# Patient Record
Sex: FEMALE | Race: BLACK OR AFRICAN AMERICAN | Employment: PART TIME | URBAN - METROPOLITAN AREA
[De-identification: names, ages, dates, MRNs, and addresses within clinical notes are randomized per-mention and may not be internally consistent; named-entity substitution may affect disease eponyms.]

---

## 2021-01-14 ENCOUNTER — HOSPITAL ENCOUNTER (OUTPATIENT)
Age: 26
Discharge: HOME | End: 2021-01-14
Payer: COMMERCIAL

## 2021-01-14 ENCOUNTER — HOSPITAL ENCOUNTER (OUTPATIENT)
Dept: GENERAL RADIOLOGY | Age: 26
Discharge: HOME | End: 2021-01-14
Payer: COMMERCIAL

## 2021-01-14 ENCOUNTER — OFFICE VISIT (OUTPATIENT)
Dept: PRIMARY CARE CLINIC | Age: 26
End: 2021-01-14
Payer: COMMERCIAL

## 2021-01-14 VITALS
DIASTOLIC BLOOD PRESSURE: 70 MMHG | TEMPERATURE: 97.2 F | SYSTOLIC BLOOD PRESSURE: 110 MMHG | WEIGHT: 172 LBS | HEART RATE: 81 BPM | HEIGHT: 64 IN | BODY MASS INDEX: 29.37 KG/M2 | OXYGEN SATURATION: 98 %

## 2021-01-14 DIAGNOSIS — R55 VASOVAGAL SYNCOPE: Primary | ICD-10-CM

## 2021-01-14 DIAGNOSIS — D50.0 IRON DEFICIENCY ANEMIA DUE TO CHRONIC BLOOD LOSS: ICD-10-CM

## 2021-01-14 DIAGNOSIS — R06.2 WHEEZING: ICD-10-CM

## 2021-01-14 DIAGNOSIS — F41.8 DEPRESSION WITH ANXIETY: ICD-10-CM

## 2021-01-14 DIAGNOSIS — R55 VASOVAGAL SYNCOPE: ICD-10-CM

## 2021-01-14 LAB
BASOPHILS ABSOLUTE: 0 K/UL (ref 0–0.2)
BASOPHILS RELATIVE PERCENT: 0.6 %
CONTROL: NORMAL
EOSINOPHILS ABSOLUTE: 0.1 K/UL (ref 0–0.6)
EOSINOPHILS RELATIVE PERCENT: 1.8 %
HCT VFR BLD CALC: 38.9 % (ref 36–48)
HEMOGLOBIN: 12.4 G/DL (ref 12–16)
LYMPHOCYTES ABSOLUTE: 2 K/UL (ref 1–5.1)
LYMPHOCYTES RELATIVE PERCENT: 32 %
MCH RBC QN AUTO: 25.2 PG (ref 26–34)
MCHC RBC AUTO-ENTMCNC: 31.9 G/DL (ref 31–36)
MCV RBC AUTO: 79 FL (ref 80–100)
MONOCYTES ABSOLUTE: 0.3 K/UL (ref 0–1.3)
MONOCYTES RELATIVE PERCENT: 5.3 %
NEUTROPHILS ABSOLUTE: 3.7 K/UL (ref 1.7–7.7)
NEUTROPHILS RELATIVE PERCENT: 60.3 %
PDW BLD-RTO: 14.2 % (ref 12.4–15.4)
PLATELET # BLD: 270 K/UL (ref 135–450)
PMV BLD AUTO: 8.4 FL (ref 5–10.5)
PREGNANCY TEST URINE, POC: NORMAL
RBC # BLD: 4.93 M/UL (ref 4–5.2)
WBC # BLD: 6.2 K/UL (ref 4–11)

## 2021-01-14 PROCEDURE — 81025 URINE PREGNANCY TEST: CPT | Performed by: STUDENT IN AN ORGANIZED HEALTH CARE EDUCATION/TRAINING PROGRAM

## 2021-01-14 PROCEDURE — 99213 OFFICE O/P EST LOW 20 MIN: CPT | Performed by: STUDENT IN AN ORGANIZED HEALTH CARE EDUCATION/TRAINING PROGRAM

## 2021-01-14 PROCEDURE — 71046 X-RAY EXAM CHEST 2 VIEWS: CPT

## 2021-01-14 RX ORDER — SERTRALINE HYDROCHLORIDE 100 MG/1
75 TABLET, FILM COATED ORAL DAILY
COMMUNITY
End: 2021-02-09

## 2021-01-14 RX ORDER — ACETAMINOPHEN 160 MG
2000 TABLET,DISINTEGRATING ORAL
COMMUNITY
End: 2021-02-09

## 2021-01-14 RX ORDER — NORETHINDRONE ACETATE AND ETHINYL ESTRADIOL 1MG-20(21)
1 KIT ORAL DAILY
COMMUNITY
End: 2021-07-12 | Stop reason: SDUPTHER

## 2021-01-14 RX ORDER — FERROUS SULFATE 325(65) MG
325 TABLET ORAL
COMMUNITY
End: 2021-02-09

## 2021-01-14 RX ORDER — DOCUSATE SODIUM 100 MG/1
100 CAPSULE, LIQUID FILLED ORAL 2 TIMES DAILY
COMMUNITY
End: 2021-02-09

## 2021-01-14 SDOH — ECONOMIC STABILITY: INCOME INSECURITY: HOW HARD IS IT FOR YOU TO PAY FOR THE VERY BASICS LIKE FOOD, HOUSING, MEDICAL CARE, AND HEATING?: NOT HARD AT ALL

## 2021-01-14 SDOH — ECONOMIC STABILITY: TRANSPORTATION INSECURITY
IN THE PAST 12 MONTHS, HAS THE LACK OF TRANSPORTATION KEPT YOU FROM MEDICAL APPOINTMENTS OR FROM GETTING MEDICATIONS?: NO

## 2021-01-14 ASSESSMENT — PATIENT HEALTH QUESTIONNAIRE - PHQ9: 2. FEELING DOWN, DEPRESSED OR HOPELESS: 1

## 2021-01-14 NOTE — PROGRESS NOTES
2021    Dev Odom (:  1995) is a 30 y.o. female, here for a preventive medicine evaluation. There is no problem list on file for this patient. Last night was sitting in bathroom just talking and started feeling dizzy then out for 15-30 seconds, some twitching but no seizure activity. Took diflucan Tuesday then a week before that. No vision changes. No HA. No weakness in arms or legs. Heavy snorer. Has never happened before  No changes in diet  No lightheadedness with position change    Menses: used to be very heavy was started on bc pills and iron pills, still will have slightly heavier periods still and will last 7 days, no clotting or pain, regular pads 8 pads fully saturated. Was started on bc pills and iron pills . Past medical history:  States she had asthma when she was a child but does not require any asthma medication    FH: father seizures  Aunts lupus     LMP last Thursday     Anxiety and depression  -Well-controlled on Zoloft 75 mg daily  -Has been on this for a couple of years now  -She is interested in coming off of Zoloft secondary to weight gain  -Anxiety depression has been stable for over a year    Chronic yeast infections  After marriage in July started getting them every other month, itching, no rash, likely be discharge      Review of Systems   Constitutional: Negative for chills and fever. HENT: Negative for congestion, rhinorrhea and sore throat. Eyes: Negative for visual disturbance. Respiratory: Negative for cough and shortness of breath. Cardiovascular: Negative for chest pain. Gastrointestinal: Negative for constipation, diarrhea, nausea and vomiting. Endocrine: Negative for polydipsia and polyuria. Genitourinary: Negative for dysuria. Musculoskeletal: Negative for arthralgias. Skin: Negative for rash. Allergic/Immunologic: Negative for environmental allergies. Neurological: Negative for headaches.    Psychiatric/Behavioral: The patient is not nervous/anxious. Prior to Visit Medications    Medication Sig Taking? Authorizing Provider   sertraline (ZOLOFT) 100 MG tablet Take 75 mg by mouth daily Yes Historical Provider, MD   norethindrone-ethinyl estradiol (JUNEL FE 1/20) 1-20 MG-MCG per tablet Take 1 tablet by mouth daily Yes Historical Provider, MD   Cholecalciferol (VITAMIN D3) 50 MCG (2000 UT) CAPS Take 2,000 Units by mouth Yes Historical Provider, MD   ferrous sulfate (IRON 325) 325 (65 Fe) MG tablet Take 325 mg by mouth daily (with breakfast) Yes Historical Provider, MD   docusate sodium (COLACE) 100 MG capsule Take 100 mg by mouth 2 times daily Yes Historical Provider, MD        Allergies   Allergen Reactions    Pcn [Penicillins] Rash       No past medical history on file. No past surgical history on file.     Social History     Socioeconomic History    Marital status:      Spouse name: Not on file    Number of children: Not on file    Years of education: Not on file    Highest education level: Not on file   Occupational History    Not on file   Social Needs    Financial resource strain: Not hard at all    Food insecurity     Worry: Never true     Inability: Never true   [employer] needs     Medical: No     Non-medical: No   Tobacco Use    Smoking status: Never Smoker    Smokeless tobacco: Never Used   Substance and Sexual Activity    Alcohol use: Never     Frequency: Never     Binge frequency: Never    Drug use: Never    Sexual activity: Not on file   Lifestyle    Physical activity     Days per week: Not on file     Minutes per session: Not on file    Stress: Not on file   Relationships    Social connections     Talks on phone: Not on file     Gets together: Not on file     Attends Moravian service: Not on file     Active member of club or organization: Not on file     Attends meetings of clubs or organizations: Not on file     Relationship status: Not on file    Intimate partner violence Fear of current or ex partner: Not on file     Emotionally abused: Not on file     Physically abused: Not on file     Forced sexual activity: Not on file   Other Topics Concern    Not on file   Social History Narrative    Not on file        No family history on file. ADVANCE DIRECTIVE: N, <no information>    Vitals:    01/14/21 1335   BP: 110/70   Site: Right Upper Arm   Position: Sitting   Cuff Size: Medium Adult   Pulse: 81   Temp: 97.2 °F (36.2 °C)   TempSrc: Infrared   SpO2: 98%   Weight: 172 lb (78 kg)   Height: 5' 3.6\" (1.615 m)     Estimated body mass index is 29.9 kg/m² as calculated from the following:    Height as of this encounter: 5' 3.6\" (1.615 m). Weight as of this encounter: 172 lb (78 kg). Physical Exam  Vitals signs reviewed. Constitutional:       General: She is not in acute distress. Appearance: Normal appearance. She is not ill-appearing. HENT:      Head: Normocephalic and atraumatic. Right Ear: Tympanic membrane, ear canal and external ear normal.      Left Ear: Tympanic membrane, ear canal and external ear normal.      Nose: Nose normal. No rhinorrhea. Mouth/Throat:      Mouth: Mucous membranes are moist.      Pharynx: Oropharynx is clear. No oropharyngeal exudate. Eyes:      General: No scleral icterus. Right eye: No discharge. Left eye: No discharge. Extraocular Movements: Extraocular movements intact. Conjunctiva/sclera: Conjunctivae normal.   Neck:      Musculoskeletal: Neck supple. No muscular tenderness. Cardiovascular:      Rate and Rhythm: Normal rate and regular rhythm. Pulses: Normal pulses. Heart sounds: Normal heart sounds. No murmur. No gallop. Pulmonary:      Effort: Pulmonary effort is normal.      Breath sounds: Normal breath sounds. No rhonchi or rales. Comments: Slight expiratory wheeze right upper lobe  Abdominal:      General: Abdomen is flat. Bowel sounds are normal. There is no distension. Palpations: Abdomen is soft. There is no mass. Musculoskeletal: Normal range of motion. Lymphadenopathy:      Cervical: No cervical adenopathy. Skin:     General: Skin is warm. Capillary Refill: Capillary refill takes less than 2 seconds. Findings: No rash. Neurological:      General: No focal deficit present. Mental Status: She is alert. Cranial Nerves: No cranial nerve deficit. Psychiatric:         Mood and Affect: Mood normal.         Behavior: Behavior normal.         No flowsheet data found. Lab Results   Component Value Date    GLUCOSE 106 01/14/2021    LABA1C 5.6 01/14/2021       The ASCVD Risk score (Donna Avila et al., 2013) failed to calculate for the following reasons: The 2013 ASCVD risk score is only valid for ages 36 to 78      There is no immunization history on file for this patient. Health Maintenance   Topic Date Due    Hepatitis C screen  1995    Varicella vaccine (1 of 2 - 2-dose childhood series) 11/02/1994    HIV screen  11/02/2008    DTaP/Tdap/Td vaccine (1 - Tdap) 11/02/2012    Cervical cancer screen  11/02/2014    Flu vaccine (1) 01/14/2022 (Originally 9/1/2020)    Hepatitis A vaccine  Aged Out    Hepatitis B vaccine  Aged Out    Hib vaccine  Aged Out    Meningococcal (ACWY) vaccine  Aged Out    Pneumococcal 0-64 years Vaccine  Aged Out       ASSESSMENT/PLAN:    47-year-old presenting today to establish care had one episode of syncope likely vasovagal versus medication side effect from the Diflucan. No seizure-like activity, no cardiac symptoms. She does have a history of menorrhagia and iron deficiency anemia she has been on therapy we will go ahead and recheck levels today. Will screen for diabetes especially in setting of chronic yeast infections and polyuria polydipsia. 1. Vasovagal syncope  -     Ferritin; Future  -     IRON AND TIBC; Future  -     COMPREHENSIVE METABOLIC PANEL;  Future  -     CBC WITH AUTO DIFFERENTIAL; Future  -     POCT urine pregnancy  -     HEMOGLOBIN A1C; Future  -     TSH WITH REFLEX TO FT4; Future  -     VAGINAL PATHOGENS PROBE *A  2. Wheezing  -     XR CHEST STANDARD (2 VW); Future  -Self-reported asthma when she was a child but no other symptoms no shortness of breath or cough. 3. Iron deficiency anemia due to chronic blood loss  -CBC and ferritin  4. Depression with anxiety  -Can start weaning off of Zoloft  -I did offer Wellbutrin to counteract the weight gain and Zoloft patient declined  -I think because she has been stable for quite some time now that we can try coming off of the medication. Did  patient on possible refractory anxiety and depression while coming off medication typically this will even out after couple of weeks. Return in about 4 weeks (around 2/11/2021). An electronic signature was used to authenticate this note.     --Renuka Kincaid MD on 1/15/2021 at 9:25 AM

## 2021-01-14 NOTE — PATIENT INSTRUCTIONS
Zoloft wean:   50 mg daily for 1 week  25 mg for one week  25 mg every other day for 2 weeks  Then stop
“You can access the Follow[facility] Patient Portal, offered by [facility], by registering with the following website: [email or web address]”

## 2021-01-15 PROBLEM — F41.8 DEPRESSION WITH ANXIETY: Status: ACTIVE | Noted: 2021-01-15

## 2021-01-15 PROBLEM — R55 VASOVAGAL SYNCOPE: Status: ACTIVE | Noted: 2021-01-15

## 2021-01-15 LAB
A/G RATIO: 1.2 (ref 1.1–2.2)
ALBUMIN SERPL-MCNC: 4.1 G/DL (ref 3.4–5)
ALP BLD-CCNC: 59 U/L (ref 40–129)
ALT SERPL-CCNC: 15 U/L (ref 10–40)
ANION GAP SERPL CALCULATED.3IONS-SCNC: 12 MMOL/L (ref 3–16)
AST SERPL-CCNC: 15 U/L (ref 15–37)
BILIRUB SERPL-MCNC: <0.2 MG/DL (ref 0–1)
BUN BLDV-MCNC: 10 MG/DL (ref 7–20)
CALCIUM SERPL-MCNC: 9.5 MG/DL (ref 8.3–10.6)
CANDIDA SPECIES, DNA PROBE: NORMAL
CHLORIDE BLD-SCNC: 104 MMOL/L (ref 99–110)
CO2: 25 MMOL/L (ref 21–32)
CREAT SERPL-MCNC: 0.9 MG/DL (ref 0.6–1.1)
ESTIMATED AVERAGE GLUCOSE: 114 MG/DL
FERRITIN: 104.7 NG/ML (ref 15–150)
GARDNERELLA VAGINALIS, DNA PROBE: NORMAL
GFR AFRICAN AMERICAN: >60
GFR NON-AFRICAN AMERICAN: >60
GLOBULIN: 3.3 G/DL
GLUCOSE BLD-MCNC: 106 MG/DL (ref 70–99)
HBA1C MFR BLD: 5.6 %
IRON SATURATION: 20 % (ref 15–50)
IRON: 74 UG/DL (ref 37–145)
POTASSIUM SERPL-SCNC: 4.1 MMOL/L (ref 3.5–5.1)
SODIUM BLD-SCNC: 141 MMOL/L (ref 136–145)
TOTAL IRON BINDING CAPACITY: 364 UG/DL (ref 260–445)
TOTAL PROTEIN: 7.4 G/DL (ref 6.4–8.2)
TRICHOMONAS VAGINALIS DNA: NORMAL
TSH REFLEX FT4: 2.11 UIU/ML (ref 0.27–4.2)

## 2021-01-15 ASSESSMENT — ENCOUNTER SYMPTOMS
NAUSEA: 0
SHORTNESS OF BREATH: 0
SORE THROAT: 0
VOMITING: 0
CONSTIPATION: 0
COUGH: 0
DIARRHEA: 0
RHINORRHEA: 0

## 2021-01-18 ENCOUNTER — TELEPHONE (OUTPATIENT)
Dept: PRIMARY CARE CLINIC | Age: 26
End: 2021-01-18

## 2021-01-18 NOTE — TELEPHONE ENCOUNTER
Spoke with patient about labs they are all within normal limits she has not had any other syncopal episodes no chest pain no shortness of breath no other issues. Could have been a side effect from medication that she was taking.

## 2021-01-29 ENCOUNTER — TELEPHONE (OUTPATIENT)
Dept: PRIMARY CARE CLINIC | Age: 26
End: 2021-01-29

## 2021-01-29 NOTE — TELEPHONE ENCOUNTER
I am Waning off zoloft, and I am supposed to take 1/2 tab every other day, I will be run out of it on Sat 1.30.21 and dont want to buy a whole bottle just to take few of it.

## 2021-02-01 ENCOUNTER — TELEPHONE (OUTPATIENT)
Dept: PRIMARY CARE CLINIC | Age: 26
End: 2021-02-01

## 2021-02-01 NOTE — TELEPHONE ENCOUNTER
Pt is stating that she has been getting dizziness. She says it has been getting worse within the last couple weeks. It has bee occurring a little more often and lasting a little longer. She is wondering if she has vertigo?     FOV 2/9/21  LOV 1/14/21

## 2021-02-09 ENCOUNTER — OFFICE VISIT (OUTPATIENT)
Dept: PRIMARY CARE CLINIC | Age: 26
End: 2021-02-09
Payer: COMMERCIAL

## 2021-02-09 VITALS
HEART RATE: 72 BPM | HEIGHT: 64 IN | TEMPERATURE: 98.2 F | BODY MASS INDEX: 28.54 KG/M2 | SYSTOLIC BLOOD PRESSURE: 100 MMHG | DIASTOLIC BLOOD PRESSURE: 62 MMHG | OXYGEN SATURATION: 98 % | WEIGHT: 167.2 LBS

## 2021-02-09 DIAGNOSIS — R42 POSITIONAL LIGHTHEADEDNESS: ICD-10-CM

## 2021-02-09 DIAGNOSIS — Z12.4 CERVICAL CANCER SCREENING: Primary | ICD-10-CM

## 2021-02-09 DIAGNOSIS — G43.919 INTRACTABLE MIGRAINE WITHOUT STATUS MIGRAINOSUS, UNSPECIFIED MIGRAINE TYPE: ICD-10-CM

## 2021-02-09 PROCEDURE — 93000 ELECTROCARDIOGRAM COMPLETE: CPT | Performed by: STUDENT IN AN ORGANIZED HEALTH CARE EDUCATION/TRAINING PROGRAM

## 2021-02-09 PROCEDURE — 99213 OFFICE O/P EST LOW 20 MIN: CPT | Performed by: STUDENT IN AN ORGANIZED HEALTH CARE EDUCATION/TRAINING PROGRAM

## 2021-02-09 RX ORDER — SUMATRIPTAN 50 MG/1
TABLET, FILM COATED ORAL
Qty: 27 TABLET | Refills: 0 | Status: SHIPPED | OUTPATIENT
Start: 2021-02-09 | End: 2021-05-04

## 2021-02-09 RX ORDER — MECLIZINE HCL 12.5 MG/1
12.5 TABLET ORAL 2 TIMES DAILY PRN
Qty: 30 TABLET | Refills: 0 | Status: SHIPPED | OUTPATIENT
Start: 2021-02-09 | End: 2021-02-19

## 2021-02-09 ASSESSMENT — ENCOUNTER SYMPTOMS
SHORTNESS OF BREATH: 0
CONSTIPATION: 0
RHINORRHEA: 0
DIARRHEA: 0
VOMITING: 0
SORE THROAT: 0
COUGH: 0
NAUSEA: 0

## 2021-02-09 NOTE — PROGRESS NOTES
2021    Dulce Osman (:  1995) is a 30 y.o. female, here for a preventive medicine evaluation. Patient Active Problem List   Diagnosis    Vasovagal syncope    Depression with anxiety     Dizziness x 2 weeks ago, was walking back into building from elevator, doesn't really feel like room is spinning, will get sensation when she rolls over in bed a little but worse when bending over and standing up, sitting to standing, no vision changes, LMP  and ended on . Headaches x 1 week sensitive to light and sound. Tylenol works a little but HA's still happen for hours, HA do not wake her from sleep, occiptal headache, if she was running then she will get lightheaded. No heavy menstrual periods, had a syncopal episode that I saw her for last visit and that has not happened since then she states this feels different. No ear pain or ear discharge has not been sick recently. 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 No increased stress. No bowel or bladder complaints. No significant family history, last menstrual period was recently and not particularly heavy. Labs last month for thyroid, diabetes, and anemia as well as iron deficiency were all within normal limits. She has been slowly weaning off of Zoloft and has been off of this for about a week now. She was at 75 mg daily and started weaning back in January and just recently went to 25 mg every other day and they can completely off of it about a week ago. Review of Systems   Constitutional: Negative for chills and fever. HENT: Negative for congestion, rhinorrhea and sore throat. Eyes: Negative for visual disturbance. Respiratory: Negative for cough and shortness of breath. Cardiovascular: Negative for chest pain. Gastrointestinal: Negative for constipation, diarrhea, nausea and vomiting. Endocrine: Negative for polydipsia and polyuria. Genitourinary: Negative for dysuria.    Musculoskeletal: Negative for arthralgias. Skin: Negative for rash. Allergic/Immunologic: Negative for environmental allergies. Neurological: Negative for headaches. Psychiatric/Behavioral: The patient is not nervous/anxious. Prior to Visit Medications    Medication Sig Taking? Authorizing Provider   meclizine (ANTIVERT) 12.5 MG tablet Take 1 tablet by mouth 2 times daily as needed for Dizziness Yes Ramesh Coronado MD   SUMAtriptan (IMITREX) 50 MG tablet 50 mg once, may repeat a dose after 2 hours. Yes Ramesh Coronado MD   norethindrone-ethinyl estradiol (JUNEL FE 1/20) 1-20 MG-MCG per tablet Take 1 tablet by mouth daily Yes Historical Provider, MD        Allergies   Allergen Reactions    Pcn [Penicillins] Rash       No past medical history on file. No past surgical history on file.     Social History     Socioeconomic History    Marital status:      Spouse name: Not on file    Number of children: Not on file    Years of education: Not on file    Highest education level: Not on file   Occupational History    Not on file   Social Needs    Financial resource strain: Not hard at all    Food insecurity     Worry: Never true     Inability: Never true   [employer] needs     Medical: No     Non-medical: No   Tobacco Use    Smoking status: Never Smoker    Smokeless tobacco: Never Used   Substance and Sexual Activity    Alcohol use: Never     Frequency: Never     Binge frequency: Never    Drug use: Never    Sexual activity: Not on file   Lifestyle    Physical activity     Days per week: Not on file     Minutes per session: Not on file    Stress: Not on file   Relationships    Social connections     Talks on phone: Not on file     Gets together: Not on file     Attends Islam service: Not on file     Active member of club or organization: Not on file     Attends meetings of clubs or organizations: Not on file     Relationship status: Not on file    Intimate partner violence     Fear of current or ex partner: Not on file     Emotionally abused: Not on file     Physically abused: Not on file     Forced sexual activity: Not on file   Other Topics Concern    Not on file   Social History Narrative    Not on file        No family history on file. ADVANCE DIRECTIVE: N, <no information>    Vitals:    02/09/21 1001   BP: 100/62   Site: Right Upper Arm   Position: Sitting   Cuff Size: Medium Adult   Pulse: 72   Temp: 98.2 °F (36.8 °C)   TempSrc: Infrared   SpO2: 98%   Weight: 167 lb 3.2 oz (75.8 kg)   Height: 5' 3.6\" (1.615 m)     Estimated body mass index is 29.06 kg/m² as calculated from the following:    Height as of this encounter: 5' 3.6\" (1.615 m). Weight as of this encounter: 167 lb 3.2 oz (75.8 kg). Physical Exam  Vitals signs reviewed. Constitutional:       General: She is not in acute distress. Appearance: Normal appearance. She is not ill-appearing. HENT:      Head: Normocephalic and atraumatic. Right Ear: Tympanic membrane, ear canal and external ear normal.      Left Ear: Tympanic membrane, ear canal and external ear normal.      Nose: Nose normal. No rhinorrhea. Mouth/Throat:      Mouth: Mucous membranes are moist.      Pharynx: Oropharynx is clear. No oropharyngeal exudate. Eyes:      General: No scleral icterus. Right eye: No discharge. Left eye: No discharge. Extraocular Movements: Extraocular movements intact. Conjunctiva/sclera: Conjunctivae normal.   Neck:      Musculoskeletal: Neck supple. No muscular tenderness. Cardiovascular:      Rate and Rhythm: Normal rate and regular rhythm. Pulses: Normal pulses. Heart sounds: Normal heart sounds. No murmur. No gallop. Pulmonary:      Effort: Pulmonary effort is normal.      Breath sounds: Normal breath sounds. No wheezing, rhonchi or rales. Abdominal:      General: Abdomen is flat. Bowel sounds are normal. There is no distension. Palpations: Abdomen is soft. There is no mass. Musculoskeletal: Normal range of motion. Lymphadenopathy:      Cervical: No cervical adenopathy. Skin:     General: Skin is warm. Capillary Refill: Capillary refill takes less than 2 seconds. Findings: No rash. Neurological:      General: No focal deficit present. Mental Status: She is alert. Cranial Nerves: No cranial nerve deficit. Motor: No weakness. Coordination: Coordination normal.      Gait: Gait normal.      Deep Tendon Reflexes: Reflexes normal.      Comments: HINTS exam negative    Chente Hallpike negative     Psychiatric:         Mood and Affect: Mood normal.         Behavior: Behavior normal.         No flowsheet data found. Lab Results   Component Value Date    GLUCOSE 106 01/14/2021    LABA1C 5.6 01/14/2021       The ASCVD Risk score (Nicole Lynch, et al., 2013) failed to calculate for the following reasons: The 2013 ASCVD risk score is only valid for ages 36 to 78      There is no immunization history on file for this patient. Health Maintenance   Topic Date Due    Hepatitis C screen  1993    Varicella vaccine (1 of 2 - 2-dose childhood series) 11/02/1994    HIV screen  11/02/2008    DTaP/Tdap/Td vaccine (1 - Tdap) 11/02/2012    Cervical cancer screen  11/02/2014    Flu vaccine (1) 01/14/2022 (Originally 9/1/2020)    Hepatitis A vaccine  Aged Out    Hepatitis B vaccine  Aged Out    Hib vaccine  Aged Out    Meningococcal (ACWY) vaccine  Aged Out    Pneumococcal 0-64 years Vaccine  Aged Out       ASSESSMENT/PLAN:    78-year-old here for her Pap smear as well as lightheadedness. She does have some symptoms at home that are consistent with vertigo but testing in office today did not elicit any type of vertigo symptoms. She also has some findings for vasovagal syncope on previous visit as well as lightheadedness with position change. EKG did not show any abnormal rhythm.   Her blood pressure is on the lower end of normal wondering if this is contributing. Orthostatic vital signs in office today only showed a 10 point drop from lying to standing systolic. Her headache does not seem to be well controlled and states that it is pretty constant and does not go away completely and seems to be worsening over the past couple of weeks. We will get CT of her head but also would like her to be evaluated by cardiology. She has several different things that could be going on. Likely vasovagal lightheadedness versus medication weaning side effect versus migraines vs positional vertigo. 1. Cervical cancer screening  - PAP SMEAR    2. Positional lightheadedness  - EKG 12 lead; Future  - EKG 12 lead  - meclizine (ANTIVERT) 12.5 MG tablet; Take 1 tablet by mouth 2 times daily as needed for Dizziness  Dispense: 30 tablet; Refill: 0  - Stefania Schaefer MD, Cardiology, [address]; Future    3. Intractable migraine without status migrainosus, unspecified migraine type  - SUMAtriptan (IMITREX) 50 MG tablet; 50 mg once, may repeat a dose after 2 hours. Dispense: 27 tablet; Refill: 0  - CT HEAD WO CONTRAST; Future      Return in about 4 weeks (around 3/9/2021). An electronic signature was used to authenticate this note.     --Monique Byrd MD on 2/9/2021 at 1:09 PM

## 2021-02-12 ENCOUNTER — TELEPHONE (OUTPATIENT)
Dept: PRIMARY CARE CLINIC | Age: 26
End: 2021-02-12

## 2021-02-12 NOTE — TELEPHONE ENCOUNTER
Patient called ended up going to the hospital last night they did a CT scan and the results came back normal.   Also the medication prescribed for the headaches hasn't been helping and she has been wondering if there is anything else that she can try.      Thank you

## 2021-02-15 DIAGNOSIS — R51.9 WORSENING HEADACHES: Primary | ICD-10-CM

## 2021-02-15 RX ORDER — ONDANSETRON 4 MG/1
4 TABLET, FILM COATED ORAL EVERY 12 HOURS PRN
Qty: 30 TABLET | Refills: 0 | Status: SHIPPED | OUTPATIENT
Start: 2021-02-15 | End: 2022-07-11

## 2021-02-15 NOTE — TELEPHONE ENCOUNTER
Pt fu on previous phone call pt states she is not feeling well. Her covid was negative her ct was completed @ . Pt sttaes she is not taking the medication she got @ the last visit because it made her sick .  Pt want to know what else can she do

## 2021-02-16 ENCOUNTER — TELEPHONE (OUTPATIENT)
Dept: PRIMARY CARE CLINIC | Age: 26
End: 2021-02-16

## 2021-02-16 NOTE — TELEPHONE ENCOUNTER
Pt says she has an appt on Thurs to see one of Chiquita Bello Delaney's partners. She is worried because there is supposed to be more inclement weather on Thursday. Is there someone  can recommend that she can see tomorrow?

## 2021-02-17 ENCOUNTER — TELEPHONE (OUTPATIENT)
Dept: PRIMARY CARE CLINIC | Age: 26
End: 2021-02-17

## 2021-02-17 NOTE — TELEPHONE ENCOUNTER
----- Message from Yennifer Cervantes sent at 2/15/2021  4:30 PM EST -----  Subject: Message to Provider    QUESTIONS  Information for Provider? Patient is looking for more recommendations for   a neurologist please advise patient regarding recommendations   ---------------------------------------------------------------------------  --------------  CALL BACK INFO  What is the best way for the office to contact you? OK to leave message on   voicemail  Preferred Call Back Phone Number? [phone]  ---------------------------------------------------------------------------  --------------  SCRIPT ANSWERS  Relationship to Patient?  Self

## 2021-02-18 NOTE — TELEPHONE ENCOUNTER
I spoke with patient she was actually able to make an appointment with a neurologist for today and she will be seeing them in a couple of hours. The Nurtec that I prescribed for her did seem to help a little bit and was able to go to work but states that when she drives home and sits down at home that she will start experiencing some of the same symptoms again. Will await evaluation from neurology.

## 2021-03-22 ENCOUNTER — TELEPHONE (OUTPATIENT)
Dept: PRIMARY CARE CLINIC | Age: 26
End: 2021-03-22

## 2021-03-25 ENCOUNTER — NURSE ONLY (OUTPATIENT)
Dept: PRIMARY CARE CLINIC | Age: 26
End: 2021-03-25

## 2021-03-25 DIAGNOSIS — N89.8 VAGINAL DISCHARGE: Primary | ICD-10-CM

## 2021-03-26 LAB
CANDIDA SPECIES, DNA PROBE: ABNORMAL
GARDNERELLA VAGINALIS, DNA PROBE: ABNORMAL
TRICHOMONAS VAGINALIS DNA: ABNORMAL

## 2021-03-26 RX ORDER — FLUCONAZOLE 150 MG/1
150 TABLET ORAL ONCE
Qty: 1 TABLET | Refills: 1 | Status: SHIPPED | OUTPATIENT
Start: 2021-03-26 | End: 2021-03-26

## 2021-04-08 ENCOUNTER — TELEPHONE (OUTPATIENT)
Dept: PRIMARY CARE CLINIC | Age: 26
End: 2021-04-08

## 2021-04-08 DIAGNOSIS — B37.31 VAGINAL YEAST INFECTION: Primary | ICD-10-CM

## 2021-04-08 RX ORDER — FLUCONAZOLE 150 MG/1
150 TABLET ORAL
Qty: 3 TABLET | Refills: 0 | Status: SHIPPED | OUTPATIENT
Start: 2021-04-08 | End: 2022-07-11

## 2021-04-08 NOTE — TELEPHONE ENCOUNTER
Please notify pt that I sent in diflucan to be taken 1 pill every 72hrs for 3 doses. She will need to follow up in clinic in a month for testing.

## 2021-04-19 ENCOUNTER — TELEPHONE (OUTPATIENT)
Dept: PRIMARY CARE CLINIC | Age: 26
End: 2021-04-19

## 2021-04-19 NOTE — TELEPHONE ENCOUNTER
Pt called and still has the yeast infection. She has been having them monthly and is lasting longer. Please call pt at your earliest convenience to see what the next step is.

## 2021-04-20 ENCOUNTER — OFFICE VISIT (OUTPATIENT)
Dept: PRIMARY CARE CLINIC | Age: 26
End: 2021-04-20
Payer: COMMERCIAL

## 2021-04-20 VITALS
WEIGHT: 166.4 LBS | BODY MASS INDEX: 28.92 KG/M2 | HEART RATE: 91 BPM | TEMPERATURE: 97.6 F | DIASTOLIC BLOOD PRESSURE: 78 MMHG | SYSTOLIC BLOOD PRESSURE: 108 MMHG | OXYGEN SATURATION: 98 %

## 2021-04-20 DIAGNOSIS — B37.31 YEAST VAGINITIS: Primary | ICD-10-CM

## 2021-04-20 DIAGNOSIS — N76.0 BACTERIAL VAGINITIS: ICD-10-CM

## 2021-04-20 DIAGNOSIS — B96.89 BACTERIAL VAGINITIS: ICD-10-CM

## 2021-04-20 PROCEDURE — 99213 OFFICE O/P EST LOW 20 MIN: CPT | Performed by: STUDENT IN AN ORGANIZED HEALTH CARE EDUCATION/TRAINING PROGRAM

## 2021-04-20 RX ORDER — METRONIDAZOLE 500 MG/1
500 TABLET ORAL 2 TIMES DAILY
Qty: 14 TABLET | Refills: 0 | Status: SHIPPED | OUTPATIENT
Start: 2021-04-20 | End: 2021-04-27

## 2021-04-20 ASSESSMENT — ENCOUNTER SYMPTOMS
VOMITING: 0
SHORTNESS OF BREATH: 0
CONSTIPATION: 0
NAUSEA: 0
SORE THROAT: 0
RHINORRHEA: 0
COUGH: 0
DIARRHEA: 0

## 2021-04-20 NOTE — PROGRESS NOTES
fever.   HENT: Negative for congestion, rhinorrhea and sore throat. Eyes: Negative for visual disturbance. Respiratory: Negative for cough and shortness of breath. Cardiovascular: Negative for chest pain. Gastrointestinal: Negative for constipation, diarrhea, nausea and vomiting. Endocrine: Negative for polydipsia and polyuria. Genitourinary: Negative for dysuria. Musculoskeletal: Negative for arthralgias. Skin: Negative for rash. Allergic/Immunologic: Negative for environmental allergies. Neurological: Negative for headaches. Psychiatric/Behavioral: The patient is not nervous/anxious. Physical Exam  Constitutional:       Appearance: Normal appearance. HENT:      Head: Normocephalic and atraumatic. Nose: Nose normal.      Mouth/Throat:      Mouth: Mucous membranes are moist.   Eyes:      Extraocular Movements: Extraocular movements intact. Cardiovascular:      Rate and Rhythm: Normal rate and regular rhythm. Heart sounds: Normal heart sounds. No murmur. No friction rub. No gallop. Pulmonary:      Effort: Pulmonary effort is normal.      Breath sounds: Normal breath sounds. No wheezing, rhonchi or rales. Genitourinary:           Comments: Copious white discharge, did have a small circular lesion on cervix at the 2 o'clock position, likely nabothian cyst  Skin:     General: Skin is warm. Neurological:      General: No focal deficit present. Mental Status: She is alert. Psychiatric:         Mood and Affect: Mood normal.                 An electronic signature was used to authenticate this note.     --Keith Abarca MD

## 2021-04-21 LAB
C. TRACHOMATIS DNA ,URINE: NEGATIVE
CANDIDA SPECIES, DNA PROBE: NORMAL
GARDNERELLA VAGINALIS, DNA PROBE: NORMAL
N. GONORRHOEAE DNA, URINE: NEGATIVE
TRICHOMONAS VAGINALIS DNA: NORMAL

## 2021-04-22 LAB
HPV COMMENT: NORMAL
HPV TYPE 16: NOT DETECTED
HPV TYPE 18: NOT DETECTED
HPVOH (OTHER TYPES): NOT DETECTED

## 2021-04-26 ENCOUNTER — OFFICE VISIT (OUTPATIENT)
Dept: GYNECOLOGY | Age: 26
End: 2021-04-26
Payer: COMMERCIAL

## 2021-04-26 VITALS
HEART RATE: 74 BPM | WEIGHT: 167.6 LBS | HEIGHT: 63 IN | RESPIRATION RATE: 17 BRPM | DIASTOLIC BLOOD PRESSURE: 64 MMHG | BODY MASS INDEX: 29.7 KG/M2 | SYSTOLIC BLOOD PRESSURE: 103 MMHG

## 2021-04-26 DIAGNOSIS — N89.8 VAGINAL DISCHARGE: ICD-10-CM

## 2021-04-26 DIAGNOSIS — Z01.419 WELL WOMAN EXAM WITH ROUTINE GYNECOLOGICAL EXAM: Primary | ICD-10-CM

## 2021-04-26 DIAGNOSIS — B37.9 YEAST INFECTION: ICD-10-CM

## 2021-04-26 PROCEDURE — 99203 OFFICE O/P NEW LOW 30 MIN: CPT | Performed by: OBSTETRICS & GYNECOLOGY

## 2021-04-26 ASSESSMENT — ENCOUNTER SYMPTOMS
EYES NEGATIVE: 1
ABDOMINAL PAIN: 0
GASTROINTESTINAL NEGATIVE: 1
RESPIRATORY NEGATIVE: 1

## 2021-04-27 LAB
BACTERIA WET PREP: ABNORMAL
CLUE CELLS: ABNORMAL
EPITHELIAL CELLS WET PREP: ABNORMAL
RBC WET PREP: ABNORMAL
SOURCE WET PREP: ABNORMAL
TRICHOMONAS PREP: ABNORMAL
WBC WET PREP: ABNORMAL
YEAST WET PREP: ABNORMAL

## 2021-04-27 NOTE — PROGRESS NOTES
[address] [facility] 82,[facility] 100  Dept: [phone]  Dept Fax: [phone]: [phone]     2021    Jozef Thomas (:  1995) is a 30 y.o. female, here for evaluation of the following medical concerns:    Patient is here for concern of recurrent yeast infection. Had a lesion on her cervix. Gynecologic Exam  This is a recurrent problem. The current episode started more than 1 month ago. The problem occurs intermittently. The problem has been gradually improving. Pertinent negatives include no abdominal pain or urinary symptoms. Nothing aggravates the symptoms. She has tried nothing for the symptoms. The treatment provided no relief. Review of Systems   Constitutional: Negative. HENT: Negative. Eyes: Negative. Respiratory: Negative. Cardiovascular: Negative. Gastrointestinal: Negative. Negative for abdominal pain. Genitourinary: Positive for vaginal discharge. Musculoskeletal: Negative. Skin: Negative. Neurological: Negative. Psychiatric/Behavioral: Negative. Date of Birth 1995  Past Medical History:   Diagnosis Date    Menorrhagia      History reviewed. No pertinent surgical history. OB History    Para Term  AB Living   0 0 0 0 0 0   SAB TAB Ectopic Molar Multiple Live Births   0 0 0 0 0 0     Social History     Socioeconomic History    Marital status:      Spouse name: Not on file    Number of children: Not on file    Years of education: Not on file    Highest education level: Not on file   Occupational History    Not on file   Social Needs    Financial resource strain: Not hard at all    Food insecurity     Worry: Never true     Inability: Never true   [employer] needs     Medical: No     Non-medical: No   Tobacco Use    Smoking status: Never Smoker    Smokeless tobacco: Never Used   Substance and Sexual Activity    Alcohol use:  Yes Frequency: Never     Binge frequency: Never     Comment: occasonially     Drug use: Never    Sexual activity: Yes     Partners: Male   Lifestyle    Physical activity     Days per week: Not on file     Minutes per session: Not on file    Stress: Not on file   Relationships    Social connections     Talks on phone: Not on file     Gets together: Not on file     Attends Zoroastrian service: Not on file     Active member of club or organization: Not on file     Attends meetings of clubs or organizations: Not on file     Relationship status: Not on file    Intimate partner violence     Fear of current or ex partner: Not on file     Emotionally abused: Not on file     Physically abused: Not on file     Forced sexual activity: Not on file   Other Topics Concern    Not on file   Social History Narrative    Not on file     Allergies   Allergen Reactions    Pcn [Penicillins] Rash     Outpatient Medications Marked as Taking for the 4/26/21 encounter (Office Visit) with Prem Palm MD   Medication Sig Dispense Refill    metroNIDAZOLE (FLAGYL) 500 MG tablet Take 1 tablet by mouth 2 times daily for 7 days 14 tablet 0    fluconazole (DIFLUCAN) 150 MG tablet Take 1 tablet by mouth every 72 hours 3 tablet 0    norethindrone-ethinyl estradiol ([facility]) 1-20 MG-MCG per tablet Take 1 tablet by mouth daily       Family History   Problem Relation Age of Onset    Seizures Father     Thyroid Disease Maternal Grandmother      /64 (Site: Right Upper Arm, Position: Sitting, Cuff Size: Large Adult)   Pulse 74   Resp 17   Ht 5' 3\" (1.6 m)   Wt 167 lb 9.6 oz (76 kg)   LMP 03/26/2021   BMI 29.69 kg/m²       Prior to Visit Medications    Medication Sig Taking?  Authorizing Provider   metroNIDAZOLE (FLAGYL) 500 MG tablet Take 1 tablet by mouth 2 times daily for 7 days Yes Vesna Gonzalez MD   fluconazole (DIFLUCAN) 150 MG tablet Take 1 tablet by mouth every 72 hours Yes Vesna Gonzalez MD   norethindrone-ethinyl estradiol (JUNEL FE 1/20) 1-20 MG-MCG per tablet Take 1 tablet by mouth daily Yes Historical Provider, MD   Rimegepant Sulfate 75 MG TBDP Take 1 tablet by mouth daily as needed (migraines)  Patient not taking: Reported on 4/20/2021  Lissett Cottrell MD   ondansetron (ZOFRAN) 4 MG tablet Take 1 tablet by mouth every 12 hours as needed for Nausea or Vomiting  Patient not taking: Reported on 4/20/2021  Lissett Cottrell MD   SUMAtriptan (IMITREX) 50 MG tablet 50 mg once, may repeat a dose after 2 hours. Patient not taking: Reported on 4/20/2021  Lissett Cottrell MD        Allergies   Allergen Reactions    Pcn [Penicillins] Rash       Past Medical History:   Diagnosis Date    Menorrhagia        History reviewed. No pertinent surgical history.     Social History     Socioeconomic History    Marital status:      Spouse name: Not on file    Number of children: Not on file    Years of education: Not on file    Highest education level: Not on file   Occupational History    Not on file   Social Needs    Financial resource strain: Not hard at all    Food insecurity     Worry: Never true     Inability: Never true   [employer] needs     Medical: No     Non-medical: No   Tobacco Use    Smoking status: Never Smoker    Smokeless tobacco: Never Used   Substance and Sexual Activity    Alcohol use: Yes     Frequency: Never     Binge frequency: Never     Comment: occasonially     Drug use: Never    Sexual activity: Yes     Partners: Male   Lifestyle    Physical activity     Days per week: Not on file     Minutes per session: Not on file    Stress: Not on file   Relationships    Social connections     Talks on phone: Not on file     Gets together: Not on file     Attends Religion service: Not on file     Active member of club or organization: Not on file     Attends meetings of clubs or organizations: Not on file     Relationship status: Not on file    Intimate partner violence     Fear of current or ex partner: Not on file     Emotionally abused: Not on file     Physically abused: Not on file     Forced sexual activity: Not on file   Other Topics Concern    Not on file   Social History Narrative    Not on file        Family History   Problem Relation Age of Onset    Seizures Father     Thyroid Disease Maternal Grandmother        Vitals:    04/26/21 1545   BP: 103/64   Site: Right Upper Arm   Position: Sitting   Cuff Size: Large Adult   Pulse: 74   Resp: 17   Weight: 167 lb 9.6 oz (76 kg)   Height: 5' 3\" (1.6 m)     Estimated body mass index is 29.69 kg/m² as calculated from the following:    Height as of this encounter: 5' 3\" (1.6 m). Weight as of this encounter: 167 lb 9.6 oz (76 kg). Physical Exam  Exam conducted with a chaperone present. Constitutional:       General: She is not in acute distress. Appearance: She is well-developed. She is not diaphoretic. HENT:      Head: Normocephalic. Eyes:      Pupils: Pupils are equal, round, and reactive to light. Abdominal:      General: There is no distension. Palpations: Abdomen is soft. There is no mass. Tenderness: There is no abdominal tenderness. There is no guarding or rebound. Genitourinary:     Labia:         Right: No rash, tenderness, lesion or injury. Left: No rash, tenderness, lesion or injury. Vagina: No signs of injury and foreign body. Vaginal discharge present. No erythema, tenderness or bleeding. Cervix: No cervical motion tenderness, discharge or friability. Uterus: Not deviated, not enlarged, not fixed and not tender. Adnexa:         Right: No mass, tenderness or fullness. Left: No mass, tenderness or fullness. Rectum: No external hemorrhoid. Comments: Normal urethral meatus, nl urethra, nl bladder. Musculoskeletal: Normal range of motion. General: No tenderness. Skin:     General: Skin is warm and dry. Coloration: Skin is not pale.       Findings: No erythema or rash.   Neurological:      Mental Status: She is alert and oriented to person, place, and time. Psychiatric:         Behavior: Behavior normal.         Thought Content: Thought content normal.         Judgment: Judgment normal.         ASSESSMENT/PLAN:    1. Yeast infection  Hx recurrent-last culture negative. Vulvar care discussed. OK to have intercourse and try to have a baby    2. Vaginal discharge  - Wet prep, genital  - Culture, Genital  - Culture, Ureaplasma/Mycoplasma hominis    Has a small nabothian cyst on her cervix. Told patient this is a normal finding and nothing to worry about.

## 2021-04-29 LAB — GENITAL CULTURE, ROUTINE: NORMAL

## 2021-05-01 LAB
FINAL REPORT: NORMAL
PRELIMINARY: NORMAL

## 2021-05-04 DIAGNOSIS — G43.919 INTRACTABLE MIGRAINE WITHOUT STATUS MIGRAINOSUS, UNSPECIFIED MIGRAINE TYPE: ICD-10-CM

## 2021-05-04 RX ORDER — SUMATRIPTAN 50 MG/1
TABLET, FILM COATED ORAL
Qty: 27 TABLET | Refills: 0 | Status: SHIPPED | OUTPATIENT
Start: 2021-05-04 | End: 2022-07-11

## 2021-05-04 NOTE — TELEPHONE ENCOUNTER
Medication:   Requested Prescriptions     Pending Prescriptions Disp Refills    SUMAtriptan (IMITREX) 50 MG tablet [Pharmacy Med Name: SUMATRIPTAN SUCC 50 MG TABLET] 27 tablet 0     Sig: TAKE 1 TABLET BY MOUTH ONCE FOR MIGRAINE. MAY REPEAT AFTER 2 HOURS     Last Filled:  2.9.21    Last appt: 4/20/2021   Next appt: Visit date not found    Last OARRS: No flowsheet data found.

## 2021-05-05 DIAGNOSIS — N76.0 BV (BACTERIAL VAGINOSIS): ICD-10-CM

## 2021-05-05 DIAGNOSIS — B37.9 YEAST INFECTION: ICD-10-CM

## 2021-05-05 DIAGNOSIS — Z22.39 CARRIER OF UREAPLASMA UREALYTICUM: Primary | ICD-10-CM

## 2021-05-05 DIAGNOSIS — B96.89 BV (BACTERIAL VAGINOSIS): ICD-10-CM

## 2021-05-05 RX ORDER — DOXYCYCLINE HYCLATE 100 MG
100 TABLET ORAL 2 TIMES DAILY
Qty: 20 TABLET | Refills: 0 | OUTPATIENT
Start: 2021-05-05 | End: 2021-05-15

## 2021-05-05 RX ORDER — FLUCONAZOLE 100 MG/1
100 TABLET ORAL DAILY
Qty: 7 TABLET | Refills: 0 | OUTPATIENT
Start: 2021-05-05 | End: 2021-05-12

## 2021-06-01 ENCOUNTER — TELEPHONE (OUTPATIENT)
Dept: GYNECOLOGY | Age: 26
End: 2021-06-01

## 2021-06-01 NOTE — TELEPHONE ENCOUNTER
Patient finished taking her medication last week for yeast infection. Wanted to know that since she has completed the medication should the infection be gone. Patient wanted to know how to move forward. Said that she would perfer a call back to Dr Jay Allen (not a nurse) because she already spoke to a nurse with her results and didn't feel confident in what the nurse gave her. Would feel better having a conversation with her doctor.     Patient can be reached at [phone]

## 2021-06-02 NOTE — TELEPHONE ENCOUNTER
Called and spoke with patient-answered her questions about infections. Told her she can try to get pregnant anytime. Use condoms until  gets checked out.

## 2021-06-18 ENCOUNTER — TELEPHONE (OUTPATIENT)
Dept: PRIMARY CARE CLINIC | Age: 26
End: 2021-06-18

## 2021-06-21 NOTE — TELEPHONE ENCOUNTER
Spoke with patient this weekend, she had questions about being positive for Ureaplasma. I did explain that this is sexually transmitted but not typically considered \"STD\" and the fact that Ureaplasma can be found in our urinary tract and vaginal secretions and does not always cause issues unless the pH in the vagina gets disrupted. This can create bacterial vaginosis and is recommended that it be treated with antibiotics such as doxycycline. It is also recommended that her partner be treated as well with doxycycline. They should both abstain from sexual encounters until both are treated appropriately. Patient understands.

## 2021-07-12 RX ORDER — NORETHINDRONE ACETATE AND ETHINYL ESTRADIOL 1MG-20(21)
1 KIT ORAL DAILY
Qty: 3 PACKET | Refills: 3 | Status: SHIPPED | OUTPATIENT
Start: 2021-07-12 | End: 2022-06-03

## 2021-07-12 NOTE — TELEPHONE ENCOUNTER
norethindrone-ethinyl estradiol (JUNEL FE 1/20) 1-20 MG-MCG per tablet [[id number]]     Order Details  Dose: 1 tablet Route: Oral Frequency: DAILY   Dispense Quantity: -- Refills: --          Sig: Take 1 tablet by mouth daily     [facility] [address] [place] [facility] [place] [place]   [address] [facility], [address]   Phone:  [phone]  Fax:  [phone]

## 2021-07-12 NOTE — TELEPHONE ENCOUNTER
Medication:   Requested Prescriptions     Pending Prescriptions Disp Refills    norethindrone-ethinyl estradiol (JUNEL FE 1/20) 1-20 MG-MCG per tablet 1 packet      Sig: Take 1 tablet by mouth daily     Last Filled:  No date    Last appt: 4/20/2021   Next appt: Visit date not found    Last OARRS: No flowsheet data found.

## 2021-10-18 ENCOUNTER — TELEPHONE (OUTPATIENT)
Dept: PRIMARY CARE CLINIC | Age: 26
End: 2021-10-18

## 2021-10-18 NOTE — TELEPHONE ENCOUNTER
Patient was wanting to ask Dr Taurus Galvin a question she stated it was regarding \"women's health\".  Patient did not specify

## 2021-10-18 NOTE — TELEPHONE ENCOUNTER
Patient had a question, when she is intimate with her , when she comes into contact with her husbands sperm her vagina becomes irritated and burning sensation patient requests to speak with doctor period is coming and going random spotting outside of period

## 2021-10-22 ENCOUNTER — TELEPHONE (OUTPATIENT)
Dept: PRIMARY CARE CLINIC | Age: 26
End: 2021-10-22

## 2021-10-22 ENCOUNTER — VIRTUAL VISIT (OUTPATIENT)
Dept: PRIMARY CARE CLINIC | Age: 26
End: 2021-10-22
Payer: COMMERCIAL

## 2021-10-22 DIAGNOSIS — R10.2 VAGINAL PAIN: Primary | ICD-10-CM

## 2021-10-22 PROCEDURE — 99213 OFFICE O/P EST LOW 20 MIN: CPT | Performed by: STUDENT IN AN ORGANIZED HEALTH CARE EDUCATION/TRAINING PROGRAM

## 2021-10-22 RX ORDER — GUAIFENESIN AND DEXTROMETHORPHAN HYDROBROMIDE 1200; 60 MG/1; MG/1
1 TABLET, EXTENDED RELEASE ORAL 2 TIMES DAILY PRN
Qty: 28 TABLET | Refills: 0 | Status: SHIPPED | OUTPATIENT
Start: 2021-10-22 | End: 2022-07-11

## 2021-10-22 RX ORDER — FLUTICASONE PROPIONATE 50 MCG
1 SPRAY, SUSPENSION (ML) NASAL DAILY
Qty: 16 G | Refills: 0 | Status: SHIPPED | OUTPATIENT
Start: 2021-10-22 | End: 2022-07-11

## 2021-10-22 NOTE — PROGRESS NOTES
Jonathon Crouch (:  1995) is a 27 y.o. female,Established patient, here for evaluation of the following chief complaint(s): Sexual Problem (when inside of vagina comes into contact with sperm it burns and becomes irritated )         ASSESSMENT/PLAN:  1. Vaginal pain  -     External Referral To Obstetrics & Gynecology  -     Vaginal Pathogens Probes *A; Future  Could be secondary to semen allergy vs vaginal infection, will need to come in for vaginal exam and testing. If no infection seen, will refer to Allergy. No follow-ups on file. SUBJECTIVE/OBJECTIVE:  HPI    Tylenol cold and flu, started yesterday, no fever, sore throat, stuffy nose and sneezing. No coughing, nonsmoker. Pain with intercourse  Only a few occasions where  semen caused her to have burning, couple minutes after, doesn't have unorotected sex often, will get tears or \"cuts\" on her vagina with intercourse, has noticed some vaginal dryness, no bleeding after intercourse but did start menses a week early per her pill pack. Does not use lubricants    LMP 21   On bc pills, has been taking probiotic. Include Dr Subha Louie of Systems   All other systems reviewed and are negative. Patient-Reported Vitals 10/22/2021   Patient-Reported Weight 156lb   Patient-Reported Height 5'3\"        Physical Exam  Constitutional:       Appearance: Normal appearance. HENT:      Head: Normocephalic and atraumatic. Nose: Nose normal.      Mouth/Throat:      Mouth: Mucous membranes are moist.   Eyes:      Extraocular Movements: Extraocular movements intact. Cardiovascular:      Rate and Rhythm: Normal rate and regular rhythm. Heart sounds: Normal heart sounds. No murmur heard. No friction rub. No gallop. Pulmonary:      Effort: Pulmonary effort is normal.      Breath sounds: Normal breath sounds. No wheezing, rhonchi or rales. Skin:     General: Skin is warm.    Neurological:      General: No focal deficit present. Mental Status: She is alert. Psychiatric:         Mood and Affect: Mood normal.                   Delma Lee, was evaluated through a synchronous (real-time) audio-video encounter. The patient (or guardian if applicable) is aware that this is a billable service. Verbal consent to proceed has been obtained within the past 12 months. The visit was conducted pursuant to the emergency declaration under the [address] [place] [address] and the Law [employer] and [employer] Act. Patient identification was verified, and a caregiver was present when appropriate. The patient was located in a state where the provider was credentialed to provide care. An electronic signature was used to authenticate this note.     --Irais Joshi MD

## 2021-10-22 NOTE — TELEPHONE ENCOUNTER
Please email AVS to    [email or web address]    Please also fax referral to Dr. Brad Melendrez at [facility]

## 2021-10-26 ENCOUNTER — OFFICE VISIT (OUTPATIENT)
Dept: PRIMARY CARE CLINIC | Age: 26
End: 2021-10-26
Payer: COMMERCIAL

## 2021-10-26 VITALS
TEMPERATURE: 98.4 F | DIASTOLIC BLOOD PRESSURE: 62 MMHG | BODY MASS INDEX: 28.27 KG/M2 | OXYGEN SATURATION: 99 % | WEIGHT: 159.6 LBS | HEART RATE: 84 BPM | SYSTOLIC BLOOD PRESSURE: 100 MMHG

## 2021-10-26 DIAGNOSIS — N92.6 IRREGULAR MENSES: ICD-10-CM

## 2021-10-26 DIAGNOSIS — R33.9 INCOMPLETE BLADDER EMPTYING: ICD-10-CM

## 2021-10-26 DIAGNOSIS — R10.2 VAGINAL PAIN: Primary | ICD-10-CM

## 2021-10-26 PROBLEM — R35.89 POLYURIA: Status: ACTIVE | Noted: 2021-10-26

## 2021-10-26 LAB
BILIRUBIN, POC: NEGATIVE
BLOOD URINE, POC: NORMAL
CLARITY, POC: CLEAR
COLOR, POC: YELLOW
GLUCOSE URINE, POC: NEGATIVE
KETONES, POC: NEGATIVE
LEUKOCYTE EST, POC: NEGATIVE
NITRITE, POC: NEGATIVE
PH, POC: 6.5
PROTEIN, POC: NEGATIVE
SPECIFIC GRAVITY, POC: 1.02
UROBILINOGEN, POC: 0.2

## 2021-10-26 PROCEDURE — 99214 OFFICE O/P EST MOD 30 MIN: CPT | Performed by: STUDENT IN AN ORGANIZED HEALTH CARE EDUCATION/TRAINING PROGRAM

## 2021-10-26 PROCEDURE — 81002 URINALYSIS NONAUTO W/O SCOPE: CPT | Performed by: STUDENT IN AN ORGANIZED HEALTH CARE EDUCATION/TRAINING PROGRAM

## 2021-10-26 NOTE — PROGRESS NOTES
Brice [facility] (:  1995) is a 30 y.o. female,Established patient, here for evaluation of the following chief complaint(s):  Sexual Problem (burning and irritation when semen comes into contact with inside of vagina ) and Allergies         ASSESSMENT/PLAN:  1. Vaginal pain  Assessment & Plan:   Only happens when semen eneters vagina, no anaphylaxis but is suspicious for semen allergy. If labs ok, will send to Allergy for evaluation. Discussed options of desensitization versus invitro etc.  Pt desires child bearing soon. Recommend water based lubricant for vaginal dryness. Orders:  -     Vaginal Pathogens Probes *A  2. Incomplete bladder emptying  Assessment & Plan:   Unsure if this has any relation to the vaginal pain but may require bladder scan and urodynamic testing. Orders:  -     HEMOGLOBIN A1C; Future  -     POCT Urinalysis no Micro  -     BASIC METABOLIC PANEL; Future  3. Irregular menses  Assessment & Plan:   Irregular despite being on bc pills, also with hirsutism and vaginal dryness, could consider hormonal testing. Follow up with gyn      No follow-ups on file. Subjective   SUBJECTIVE/OBJECTIVE:  HPI    Only has vaginal burning when semen in vagina, does not happen with condom use. will have vaginal dryness and skin will tear. No rashes, only localized pain in vagina , no shortness of breath, no burning with urination, feels like she doesn't empty bladder, will have full bladder and void, then will have urge to go again within hour and void a full amount again, she will push out the rest of urine to try to make sure bladder is empty. Does endorse some thincker hair on chin sometimes, mense irregular even on birth control. Did have chronic yeast infection earlier in the year. Denies rashes  Did have ureaplasma positive in 2021    Review of Systems   All other systems reviewed and are negative. Objective   Physical Exam  Exam conducted with a chaperone present. Constitutional:       Appearance: Normal appearance. HENT:      Head: Normocephalic and atraumatic. Nose: Nose normal.      Mouth/Throat:      Mouth: Mucous membranes are moist.   Eyes:      Extraocular Movements: Extraocular movements intact. Cardiovascular:      Rate and Rhythm: Normal rate and regular rhythm. Heart sounds: Normal heart sounds. No murmur heard. No friction rub. No gallop. Pulmonary:      Effort: Pulmonary effort is normal.      Breath sounds: Normal breath sounds. No wheezing, rhonchi or rales. Genitourinary:     Pubic Area: No rash. Labia:         Right: No rash or lesion. Left: No rash or lesion. Vagina: Normal.      Cervix: Discharge present. No cervical motion tenderness, friability or erythema. Uterus: Normal.       Adnexa: Right adnexa normal and left adnexa normal.   Skin:     General: Skin is warm. Neurological:      General: No focal deficit present. Mental Status: She is alert. Psychiatric:         Mood and Affect: Mood normal.                  An electronic signature was used to authenticate this note.     --Coco Michele MD

## 2021-10-27 LAB
CANDIDA SPECIES, DNA PROBE: NORMAL
GARDNERELLA VAGINALIS, DNA PROBE: NORMAL
TRICHOMONAS VAGINALIS DNA: NORMAL

## 2021-10-27 NOTE — ASSESSMENT & PLAN NOTE
Irregular despite being on bc pills, also with hirsutism and vaginal dryness, could consider hormonal testing.   Follow up with gyn

## 2021-10-27 NOTE — ASSESSMENT & PLAN NOTE
Only happens when semen eneters vagina, no anaphylaxis but is suspicious for semen allergy. If labs ok, will send to Allergy for evaluation. Discussed options of desensitization versus invitro etc.  Pt desires child bearing soon. Recommend water based lubricant for vaginal dryness.

## 2021-10-27 NOTE — ASSESSMENT & PLAN NOTE
Unsure if this has any relation to the vaginal pain but may require bladder scan and urodynamic testing.

## 2021-12-27 NOTE — TELEPHONE ENCOUNTER
Medication:   Requested Prescriptions     Pending Prescriptions Disp Refills    sertraline (ZOLOFT) 50 MG tablet [Pharmacy Med Name: SERTRALINE HCL 50 MG TABLET] 45 tablet 8     Sig: TAKE 1.5 TABS BY MOUTH EVERY NIGHT AT BEDTIME. Last Filled:  No start date listed    Last appt: 10/26/2021   Next appt: Visit date not found    Last OARRS: No flowsheet data found.

## 2022-01-07 ENCOUNTER — TELEPHONE (OUTPATIENT)
Dept: PRIMARY CARE CLINIC | Age: 27
End: 2022-01-07

## 2022-01-07 DIAGNOSIS — R10.2 VAGINAL PAIN: Primary | ICD-10-CM

## 2022-01-07 NOTE — TELEPHONE ENCOUNTER
Patient called she stated Dr Tyler Lynn gave her the recommendation to see an Allergist however she does not remember who it was or what location. Also wanting to know if she can restart her sertraline?

## 2022-01-10 ENCOUNTER — TELEPHONE (OUTPATIENT)
Dept: PRIMARY CARE CLINIC | Age: 27
End: 2022-01-10

## 2022-01-10 NOTE — TELEPHONE ENCOUNTER
Referral placed, why did she stop taking sertraline? If she has been off for awhile and just wants to restart, she can start at 50 mg daily and see how she feels in a month.

## 2022-01-10 NOTE — TELEPHONE ENCOUNTER
Patient called who was on her referral and they don't do what she is needing them to do any longer. Patient would like to know if you have any other recommendations. Thank you .

## 2022-01-14 NOTE — TELEPHONE ENCOUNTER
Spoke with patient, advised we would keep her updated that we were working on finding a specialist who treats this, patient understood

## 2022-01-24 ENCOUNTER — TELEPHONE (OUTPATIENT)
Dept: PRIMARY CARE CLINIC | Age: 27
End: 2022-01-24

## 2022-01-24 DIAGNOSIS — R10.2 VAGINAL PAIN: Primary | ICD-10-CM

## 2022-01-28 ENCOUNTER — TELEPHONE (OUTPATIENT)
Dept: PRIMARY CARE CLINIC | Age: 27
End: 2022-01-28

## 2022-01-28 DIAGNOSIS — R10.2 VAGINAL PAIN: Primary | ICD-10-CM

## 2022-01-28 NOTE — TELEPHONE ENCOUNTER
Pt says she spoke to the new allergist's office that  referred her to. They told her they don't do this type of allergy test. They told her that she needs to see an OBGYN for this type of testing. The pt wants to know if this is true? If so, she needs a list of multiple OBGYN doctors who might do this type of testing.

## 2022-01-31 NOTE — TELEPHONE ENCOUNTER
Medication:   Requested Prescriptions     Pending Prescriptions Disp Refills    sertraline (ZOLOFT) 50 MG tablet 45 tablet 8     Last Filled:  12.27.21    Last appt: 10/26/2021   Next appt: Visit date not found    Last OARRS: No flowsheet data found.

## 2022-02-01 NOTE — TELEPHONE ENCOUNTER
That is odd, since I reached out to them specifically and got a call back from their nurse stating they do the testing. I do not know specifically who does this type of testing. She should reach out to gynecologist to see if this is something she can do or atleast point her in the right direction.

## 2022-03-28 ENCOUNTER — OFFICE VISIT (OUTPATIENT)
Dept: PRIMARY CARE CLINIC | Age: 27
End: 2022-03-28
Payer: COMMERCIAL

## 2022-03-28 ENCOUNTER — TELEPHONE (OUTPATIENT)
Dept: PRIMARY CARE CLINIC | Age: 27
End: 2022-03-28

## 2022-03-28 VITALS
BODY MASS INDEX: 27.81 KG/M2 | OXYGEN SATURATION: 97 % | WEIGHT: 157 LBS | TEMPERATURE: 97.4 F | SYSTOLIC BLOOD PRESSURE: 110 MMHG | HEART RATE: 86 BPM | DIASTOLIC BLOOD PRESSURE: 64 MMHG

## 2022-03-28 DIAGNOSIS — R82.998 LEUKOCYTES IN URINE: ICD-10-CM

## 2022-03-28 DIAGNOSIS — R73.03 PREDIABETES: Primary | ICD-10-CM

## 2022-03-28 DIAGNOSIS — R31.9 HEMATURIA, UNSPECIFIED TYPE: ICD-10-CM

## 2022-03-28 LAB
BILIRUBIN, POC: NEGATIVE
BLOOD URINE, POC: NORMAL
CLARITY, POC: CLEAR
COLOR, POC: YELLOW
GLUCOSE URINE, POC: NEGATIVE
KETONES, POC: NEGATIVE
LEUKOCYTE EST, POC: NORMAL
NITRITE, POC: NEGATIVE
PH, POC: 6.5
PROTEIN, POC: NEGATIVE
SPECIFIC GRAVITY, POC: 1.01
UROBILINOGEN, POC: 0.2

## 2022-03-28 PROCEDURE — 99213 OFFICE O/P EST LOW 20 MIN: CPT | Performed by: STUDENT IN AN ORGANIZED HEALTH CARE EDUCATION/TRAINING PROGRAM

## 2022-03-28 PROCEDURE — 81002 URINALYSIS NONAUTO W/O SCOPE: CPT | Performed by: STUDENT IN AN ORGANIZED HEALTH CARE EDUCATION/TRAINING PROGRAM

## 2022-03-28 RX ORDER — CLOTRIMAZOLE AND BETAMETHASONE DIPROPIONATE 10; .64 MG/G; MG/G
CREAM TOPICAL 2 TIMES DAILY
COMMUNITY
Start: 2022-03-25 | End: 2022-07-11

## 2022-03-28 SDOH — ECONOMIC STABILITY: FOOD INSECURITY: WITHIN THE PAST 12 MONTHS, THE FOOD YOU BOUGHT JUST DIDN'T LAST AND YOU DIDN'T HAVE MONEY TO GET MORE.: NEVER TRUE

## 2022-03-28 SDOH — ECONOMIC STABILITY: FOOD INSECURITY: WITHIN THE PAST 12 MONTHS, YOU WORRIED THAT YOUR FOOD WOULD RUN OUT BEFORE YOU GOT MONEY TO BUY MORE.: NEVER TRUE

## 2022-03-28 ASSESSMENT — PATIENT HEALTH QUESTIONNAIRE - PHQ9
10. IF YOU CHECKED OFF ANY PROBLEMS, HOW DIFFICULT HAVE THESE PROBLEMS MADE IT FOR YOU TO DO YOUR WORK, TAKE CARE OF THINGS AT HOME, OR GET ALONG WITH OTHER PEOPLE: 0
SUM OF ALL RESPONSES TO PHQ QUESTIONS 1-9: 0
2. FEELING DOWN, DEPRESSED OR HOPELESS: 0
9. THOUGHTS THAT YOU WOULD BE BETTER OFF DEAD, OR OF HURTING YOURSELF: 0
3. TROUBLE FALLING OR STAYING ASLEEP: 0
SUM OF ALL RESPONSES TO PHQ QUESTIONS 1-9: 0
6. FEELING BAD ABOUT YOURSELF - OR THAT YOU ARE A FAILURE OR HAVE LET YOURSELF OR YOUR FAMILY DOWN: 0
5. POOR APPETITE OR OVEREATING: 0
4. FEELING TIRED OR HAVING LITTLE ENERGY: 0
SUM OF ALL RESPONSES TO PHQ QUESTIONS 1-9: 0
SUM OF ALL RESPONSES TO PHQ QUESTIONS 1-9: 0
SUM OF ALL RESPONSES TO PHQ9 QUESTIONS 1 & 2: 0
1. LITTLE INTEREST OR PLEASURE IN DOING THINGS: 0
7. TROUBLE CONCENTRATING ON THINGS, SUCH AS READING THE NEWSPAPER OR WATCHING TELEVISION: 0
8. MOVING OR SPEAKING SO SLOWLY THAT OTHER PEOPLE COULD HAVE NOTICED. OR THE OPPOSITE, BEING SO FIGETY OR RESTLESS THAT YOU HAVE BEEN MOVING AROUND A LOT MORE THAN USUAL: 0

## 2022-03-28 ASSESSMENT — SOCIAL DETERMINANTS OF HEALTH (SDOH): HOW HARD IS IT FOR YOU TO PAY FOR THE VERY BASICS LIKE FOOD, HOUSING, MEDICAL CARE, AND HEATING?: NOT HARD AT ALL

## 2022-03-28 NOTE — PROGRESS NOTES
Dunia Ricci (:  1995) is a 27 y.o. female,Established patient, here for evaluation of the following chief complaint(s): Other (discuss prediabetes )         ASSESSMENT/PLAN:  1. Prediabetes  -Counseled and educated patient on prediabetes, her risk for getting type 2 diabetes in pregnancy, her risk is definitely more increased and that she is prediabetic, went over dietary changes she needs to make, recommended foods low or low glycemic index, also recommend diabetes education class.  -All patient's questions were answered  -     [facility] Individual Diabetes Education (Non Care Coord Patient), [facility]  -     POCT Urinalysis no Micro  2. Leukocytes in urine  -     Culture, Urine  3. Hematuria, unspecified type  -Has been having a lot of vaginal yeast infections, no dysuria  -     Culture, Urine      Return if symptoms worsen or fail to improve. Subjective   SUBJECTIVE/OBJECTIVE:  HPI    Went to gynecology on Friday for yeast infections for   Maternal fam has diabetes  Concerned about being prediabetes  Has questions about prediabetes in pregnancy      Review of Systems   All other systems reviewed and are negative. Objective   Physical Exam             An electronic signature was used to authenticate this note.     --Andrea Jacome MD

## 2022-03-29 LAB — URINE CULTURE, ROUTINE: NORMAL

## 2022-04-06 NOTE — TELEPHONE ENCOUNTER
Medication:   Requested Prescriptions     Pending Prescriptions Disp Refills    sertraline (ZOLOFT) 50 MG tablet 135 tablet 8     Sig: TAKE 1.5 TABS BY MOUTH EVERY NIGHT AT BEDTIME. Last Filled:  1.31.22    Last appt: 3/28/2022   Next appt: Visit date not found    Last OARRS: No flowsheet data found.

## 2022-06-02 NOTE — TELEPHONE ENCOUNTER
Medication:   Requested Prescriptions     Pending Prescriptions Disp Refills    JUNEL FE 1/20 1-20 MG-MCG per tablet [Pharmacy Med Name: Iris LYNNE 1 MG-20 MCG TABLET] 84 tablet 3     Sig: TAKE 1 TABLET BY MOUTH EVERY DAY        Last Filled:      Patient Phone Number: [phone] (home)     Last appt: 3/28/2022   Next appt: Visit date not found    Last OARRS: No flowsheet data found.     Preferred Pharmacy:   [address]  Phone: [phone] Fax: [phone]

## 2022-06-03 RX ORDER — NORETHINDRONE ACETATE AND ETHINYL ESTRADIOL AND FERROUS FUMARATE 1MG-20(21)
KIT ORAL
Qty: 84 TABLET | Refills: 3 | Status: SHIPPED | OUTPATIENT
Start: 2022-06-03 | End: 2022-07-11

## 2022-07-11 ENCOUNTER — OFFICE VISIT (OUTPATIENT)
Dept: PRIMARY CARE CLINIC | Age: 27
End: 2022-07-11
Payer: COMMERCIAL

## 2022-07-11 VITALS
HEART RATE: 77 BPM | DIASTOLIC BLOOD PRESSURE: 72 MMHG | SYSTOLIC BLOOD PRESSURE: 106 MMHG | TEMPERATURE: 97 F | HEIGHT: 63 IN | WEIGHT: 165.6 LBS | OXYGEN SATURATION: 97 % | BODY MASS INDEX: 29.34 KG/M2

## 2022-07-11 DIAGNOSIS — R10.9 ABDOMINAL PAIN, UNSPECIFIED ABDOMINAL LOCATION: Primary | ICD-10-CM

## 2022-07-11 LAB
BILIRUBIN, POC: NORMAL
BLOOD URINE, POC: NORMAL
CLARITY, POC: CLEAR
COLOR, POC: YELLOW
CONTROL: NORMAL
GLUCOSE URINE, POC: NORMAL
KETONES, POC: NORMAL
LEUKOCYTE EST, POC: NORMAL
NITRITE, POC: NORMAL
PH, POC: 6
PREGNANCY TEST URINE, POC: NEGATIVE
PROTEIN, POC: NORMAL
SPECIFIC GRAVITY, POC: 1.02
UROBILINOGEN, POC: 0.2

## 2022-07-11 PROCEDURE — 81002 URINALYSIS NONAUTO W/O SCOPE: CPT | Performed by: FAMILY MEDICINE

## 2022-07-11 PROCEDURE — 81025 URINE PREGNANCY TEST: CPT | Performed by: FAMILY MEDICINE

## 2022-07-11 PROCEDURE — 99213 OFFICE O/P EST LOW 20 MIN: CPT | Performed by: FAMILY MEDICINE

## 2022-07-11 ASSESSMENT — ENCOUNTER SYMPTOMS
ALLERGIC/IMMUNOLOGIC NEGATIVE: 1
VOMITING: 1
RESPIRATORY NEGATIVE: 1
BACK PAIN: 1
SORE THROAT: 0
EYES NEGATIVE: 1

## 2022-07-12 LAB — URINE CULTURE, ROUTINE: NORMAL

## 2023-03-08 NOTE — TELEPHONE ENCOUNTER
Pt is requesting to speak with Dr Ladonna Booth   She has some concerns about her test results she Had @ the [facility]   Please call pt @ [phone]